# Patient Record
Sex: MALE | Race: NATIVE HAWAIIAN OR OTHER PACIFIC ISLANDER | NOT HISPANIC OR LATINO | Employment: FULL TIME | URBAN - METROPOLITAN AREA
[De-identification: names, ages, dates, MRNs, and addresses within clinical notes are randomized per-mention and may not be internally consistent; named-entity substitution may affect disease eponyms.]

---

## 2019-07-09 ENCOUNTER — APPOINTMENT (OUTPATIENT)
Dept: RADIOLOGY | Age: 34
End: 2019-07-09
Payer: COMMERCIAL

## 2019-07-09 ENCOUNTER — OFFICE VISIT (OUTPATIENT)
Dept: INTERNAL MEDICINE CLINIC | Facility: CLINIC | Age: 34
End: 2019-07-09
Payer: COMMERCIAL

## 2019-07-09 VITALS
BODY MASS INDEX: 39.01 KG/M2 | SYSTOLIC BLOOD PRESSURE: 140 MMHG | DIASTOLIC BLOOD PRESSURE: 98 MMHG | TEMPERATURE: 97.6 F | HEIGHT: 69 IN | OXYGEN SATURATION: 98 % | HEART RATE: 78 BPM | WEIGHT: 263.4 LBS

## 2019-07-09 DIAGNOSIS — M25.562 ACUTE PAIN OF LEFT KNEE: ICD-10-CM

## 2019-07-09 DIAGNOSIS — Z83.3 FAMILY HISTORY OF DIABETES MELLITUS (DM): ICD-10-CM

## 2019-07-09 DIAGNOSIS — M25.562 ACUTE PAIN OF LEFT KNEE: Primary | ICD-10-CM

## 2019-07-09 DIAGNOSIS — E03.9 ACQUIRED HYPOTHYROIDISM: ICD-10-CM

## 2019-07-09 PROCEDURE — 99202 OFFICE O/P NEW SF 15 MIN: CPT | Performed by: INTERNAL MEDICINE

## 2019-07-09 PROCEDURE — 73562 X-RAY EXAM OF KNEE 3: CPT

## 2019-07-09 NOTE — ASSESSMENT & PLAN NOTE
Will order an XR of the left knee  He has returned to the gym and has been performing self therapy exercises to strengthen his knee  Continue with Tylenol/NSAIDs as needed for pain/inflammation  Will contact him regarding x-ray results

## 2019-07-09 NOTE — PROGRESS NOTES
Assessment/Plan:    Acute pain of left knee  Will order an XR of the left knee  He has returned to the gym and has been performing self therapy exercises to strengthen his knee  Continue with Tylenol/NSAIDs as needed for pain/inflammation  Will contact him regarding x-ray results  BMI 38 0-38 9,adult  Discussed diet exercise  Diagnoses and all orders for this visit:    Acute pain of left knee  -     XR knee 3 vw left non injury; Future    BMI 38 0-38 9,adult  -     CBC; Future  -     Comprehensive metabolic panel; Future  -     Lipid panel; Future  -     TSH, 3rd generation with Free T4 reflex; Future  -     Hemoglobin A1C; Future    Acquired hypothyroidism  -     CBC; Future  -     Comprehensive metabolic panel; Future  -     Lipid panel; Future  -     TSH, 3rd generation with Free T4 reflex; Future  -     Hemoglobin A1C; Future    Family history of diabetes mellitus (DM)  -     CBC; Future  -     Comprehensive metabolic panel; Future  -     Lipid panel; Future  -     TSH, 3rd generation with Free T4 reflex; Future  -     Hemoglobin A1C; Future          BMI Counseling: Body mass index is 38 9 kg/m²  Discussed the patient's BMI with him  The BMI is above average  BMI counseling and education was provided to the patient  Nutrition recommendations include reducing portion sizes, decreasing overall calorie intake, 3-5 servings of fruits/vegetables daily, reducing fast food intake, consuming healthier snacks, decreasing soda and/or juice intake, moderation in carbohydrate intake, increasing intake of lean protein, reducing intake of saturated fat and trans fat and reducing intake of cholesterol  Exercise recommendations include moderate aerobic physical activity for 150 minutes/week and exercising 3-5 times per week  Subjective:      Patient ID: Oksana Moreno is a 30 y o  male  42-year-old male is seen today to establish care    He has been having left knee pain for the past 2 weeks that he believes may have occurred during work, possibly a twisting injury  He denies any trauma, fall, or exertional activity  He reports that the pain has gradually worsened to where he now has difficulty with range of motion and weight-bearing  He has been taking Osteo Biflex and Ecedrin with minimal relief of symptoms  He denies any previous knee injuries  Knee Pain    The incident occurred more than 1 week ago  The incident occurred at work  The injury mechanism was a twisting injury  The pain is present in the left knee  The quality of the pain is described as stabbing  The pain is at a severity of 9/10  The pain is severe  Pertinent negatives include no inability to bear weight, loss of motion, loss of sensation, muscle weakness, numbness or tingling  He reports no foreign bodies present  The symptoms are aggravated by weight bearing and movement  The following portions of the patient's history were reviewed and updated as appropriate: allergies, current medications, past family history, past medical history, past social history, past surgical history and problem list     Review of Systems   Constitutional: Negative for activity change, appetite change, chills, diaphoresis, fatigue and fever  HENT: Negative for congestion, postnasal drip, rhinorrhea, sinus pressure, sinus pain, sneezing and sore throat  Eyes: Negative for visual disturbance  Respiratory: Negative for apnea, cough, choking, chest tightness, shortness of breath and wheezing  Cardiovascular: Negative for chest pain, palpitations and leg swelling  Gastrointestinal: Negative for abdominal distention, abdominal pain, anal bleeding, blood in stool, constipation, diarrhea, nausea and vomiting  Endocrine: Negative for cold intolerance and heat intolerance  Genitourinary: Negative for difficulty urinating, dysuria and hematuria  Musculoskeletal:        Knee pain, left     Skin: Negative      Neurological: Negative for dizziness, tingling, weakness, light-headedness, numbness and headaches  Hematological: Negative for adenopathy  Psychiatric/Behavioral: Negative for agitation, sleep disturbance and suicidal ideas  All other systems reviewed and are negative  History reviewed  No pertinent past medical history  No current outpatient medications on file  No Known Allergies    Social History   History reviewed  No pertinent surgical history  Family History   Problem Relation Age of Onset    Hypothyroidism Mother     Heart attack Father     Hypothyroidism Maternal Grandmother     Arthritis Maternal Grandmother     Arthritis Maternal Grandfather     Diabetes Paternal Grandmother     Diabetes Paternal Grandfather        Objective:  /98 (BP Location: Left arm, Patient Position: Sitting, Cuff Size: Large)   Pulse 78   Temp 97 6 °F (36 4 °C) (Oral)   Ht 5' 9" (1 753 m)   Wt 119 kg (263 lb 6 4 oz)   SpO2 98% Comment: room air  BMI 38 90 kg/m²     No results found for this or any previous visit (from the past 1344 hour(s))  Physical Exam   Constitutional: He is oriented to person, place, and time  He appears well-developed and well-nourished  No distress  HENT:   Head: Normocephalic and atraumatic  Eyes: Pupils are equal, round, and reactive to light  Conjunctivae and EOM are normal  Right eye exhibits no discharge  Left eye exhibits no discharge  No scleral icterus  Neck: Normal range of motion  Neck supple  No JVD present  No thyromegaly present  Cardiovascular: Normal rate, regular rhythm, normal heart sounds and intact distal pulses  Exam reveals no gallop and no friction rub  No murmur heard  Pulmonary/Chest: Effort normal and breath sounds normal  No respiratory distress  He has no wheezes  He has no rales  He exhibits no tenderness  Abdominal: Soft  Bowel sounds are normal  He exhibits no distension and no mass  There is no tenderness  There is no rebound and no guarding  Musculoskeletal: Normal range of motion  He exhibits no edema, tenderness or deformity  Left knee: He exhibits swelling (Mild)  He exhibits normal range of motion, no ecchymosis, no deformity, no laceration, no erythema, normal alignment, no bony tenderness, normal meniscus and no MCL laxity  No tenderness found  Lymphadenopathy:     He has no cervical adenopathy  Neurological: He is alert and oriented to person, place, and time  He has normal reflexes  No cranial nerve deficit  Coordination normal    Skin: Skin is warm and dry  No rash noted  He is not diaphoretic  No erythema  No pallor  Psychiatric: He has a normal mood and affect  His behavior is normal  Judgment and thought content normal    Nursing note and vitals reviewed

## 2019-07-10 ENCOUNTER — CLINICAL SUPPORT (OUTPATIENT)
Dept: INTERNAL MEDICINE CLINIC | Facility: CLINIC | Age: 34
End: 2019-07-10
Payer: COMMERCIAL

## 2019-07-10 DIAGNOSIS — E03.9 ACQUIRED HYPOTHYROIDISM: ICD-10-CM

## 2019-07-10 DIAGNOSIS — Z83.3 FAMILY HISTORY OF DIABETES MELLITUS (DM): ICD-10-CM

## 2019-07-10 PROCEDURE — 36415 COLL VENOUS BLD VENIPUNCTURE: CPT

## 2019-07-11 LAB
ALBUMIN SERPL-MCNC: 4.4 G/DL (ref 3.6–5.1)
ALBUMIN/GLOB SERPL: 1.6 (CALC) (ref 1–2.5)
ALP SERPL-CCNC: 59 U/L (ref 40–115)
ALT SERPL-CCNC: 38 U/L (ref 9–46)
AST SERPL-CCNC: 36 U/L (ref 10–40)
BILIRUB SERPL-MCNC: 1 MG/DL (ref 0.2–1.2)
BUN SERPL-MCNC: 16 MG/DL (ref 7–25)
BUN/CREAT SERPL: NORMAL (CALC) (ref 6–22)
CALCIUM SERPL-MCNC: 9.4 MG/DL (ref 8.6–10.3)
CHLORIDE SERPL-SCNC: 104 MMOL/L (ref 98–110)
CHOLEST SERPL-MCNC: 194 MG/DL
CHOLEST/HDLC SERPL: 5.7 (CALC)
CO2 SERPL-SCNC: 24 MMOL/L (ref 20–32)
CREAT SERPL-MCNC: 0.8 MG/DL (ref 0.6–1.35)
ERYTHROCYTE [DISTWIDTH] IN BLOOD BY AUTOMATED COUNT: 13 % (ref 11–15)
GLOBULIN SER CALC-MCNC: 2.8 G/DL (CALC) (ref 1.9–3.7)
GLUCOSE SERPL-MCNC: 84 MG/DL (ref 65–99)
HBA1C MFR BLD: 5.1 % OF TOTAL HGB
HCT VFR BLD AUTO: 43 % (ref 38.5–50)
HDLC SERPL-MCNC: 34 MG/DL
HGB BLD-MCNC: 14.4 G/DL (ref 13.2–17.1)
LDLC SERPL CALC-MCNC: 128 MG/DL (CALC)
MCH RBC QN AUTO: 27.9 PG (ref 27–33)
MCHC RBC AUTO-ENTMCNC: 33.5 G/DL (ref 32–36)
MCV RBC AUTO: 83.3 FL (ref 80–100)
NONHDLC SERPL-MCNC: 160 MG/DL (CALC)
PLATELET # BLD AUTO: 340 THOUSAND/UL (ref 140–400)
PMV BLD REES-ECKER: 9.9 FL (ref 7.5–12.5)
POTASSIUM SERPL-SCNC: 4.4 MMOL/L (ref 3.5–5.3)
PROT SERPL-MCNC: 7.2 G/DL (ref 6.1–8.1)
RBC # BLD AUTO: 5.16 MILLION/UL (ref 4.2–5.8)
SL AMB EGFR AFRICAN AMERICAN: 137 ML/MIN/1.73M2
SL AMB EGFR NON AFRICAN AMERICAN: 118 ML/MIN/1.73M2
SODIUM SERPL-SCNC: 140 MMOL/L (ref 135–146)
TRIGL SERPL-MCNC: 184 MG/DL
TSH SERPL-ACNC: 4.17 MIU/L (ref 0.4–4.5)
WBC # BLD AUTO: 6.4 THOUSAND/UL (ref 3.8–10.8)

## 2019-08-12 ENCOUNTER — OFFICE VISIT (OUTPATIENT)
Dept: INTERNAL MEDICINE CLINIC | Facility: CLINIC | Age: 34
End: 2019-08-12
Payer: COMMERCIAL

## 2019-08-12 VITALS
OXYGEN SATURATION: 99 % | SYSTOLIC BLOOD PRESSURE: 130 MMHG | DIASTOLIC BLOOD PRESSURE: 100 MMHG | BODY MASS INDEX: 38 KG/M2 | HEART RATE: 68 BPM | HEIGHT: 69 IN | TEMPERATURE: 98.1 F | WEIGHT: 256.6 LBS

## 2019-08-12 DIAGNOSIS — I10 ESSENTIAL HYPERTENSION: ICD-10-CM

## 2019-08-12 DIAGNOSIS — Z83.3 FAMILY HISTORY OF DIABETES MELLITUS (DM): ICD-10-CM

## 2019-08-12 PROBLEM — E03.9 ACQUIRED HYPOTHYROIDISM: Status: RESOLVED | Noted: 2019-07-09 | Resolved: 2019-08-12

## 2019-08-12 PROBLEM — M25.562 ACUTE PAIN OF LEFT KNEE: Status: RESOLVED | Noted: 2019-07-09 | Resolved: 2019-08-12

## 2019-08-12 PROCEDURE — 3008F BODY MASS INDEX DOCD: CPT | Performed by: INTERNAL MEDICINE

## 2019-08-12 PROCEDURE — 1036F TOBACCO NON-USER: CPT | Performed by: INTERNAL MEDICINE

## 2019-08-12 PROCEDURE — 99214 OFFICE O/P EST MOD 30 MIN: CPT | Performed by: INTERNAL MEDICINE

## 2019-08-12 NOTE — PROGRESS NOTES
Assessment/Plan:    Essential hypertension  He would like to defer on starting antihypertensives at this time  Discussed potential risk factors of uncontrolled BP  He would like to continues with lifestyle modifications via diet, exercise, and weight loss  Follow up in 3 months  Family history of diabetes mellitus (DM)  A1c 5 1%  Continue with lifestyle modifications  Diagnoses and all orders for this visit:    BMI 38 0-38 9,adult    Essential hypertension    Family history of diabetes mellitus (DM)          BMI Counseling: Body mass index is 37 89 kg/m²  Discussed the patient's BMI with him  The BMI is above average  BMI counseling and education was provided to the patient  Nutrition recommendations include reducing portion sizes, decreasing overall calorie intake, 3-5 servings of fruits/vegetables daily, reducing fast food intake, consuming healthier snacks, decreasing soda and/or juice intake, moderation in carbohydrate intake, increasing intake of lean protein, reducing intake of saturated fat and trans fat and reducing intake of cholesterol  Exercise recommendations include moderate aerobic physical activity for 150 minutes/week and exercising 3-5 times per week  Subjective:      Patient ID: Ashleigh Loya is a 30 y o  male  37-year-old male is seen today for follow-up of chronic conditions  Laboratory studies reviewed today, he had mild hypertriglyceridemia and LDL of 128  He exercises regularly and follows a low carbohydrate diet  Otherwise, CBC, CMP, TSH, and A1c are all within acceptable range  Since last visit, he has been performing home exercises for his left knee pain, to which he reports has improved and he recently was able to exercise on an elliptical for 30 minutes without pain  BP is elevated today, as well as at last visit 1 month ago  He denies any known h/o HTN  Hypertension   This is a chronic problem  The current episode started more than 1 year ago   The problem is unchanged  The problem is uncontrolled  Pertinent negatives include no anxiety, blurred vision, chest pain, headaches, malaise/fatigue, neck pain, orthopnea, palpitations, peripheral edema, PND, shortness of breath or sweats  Risk factors for coronary artery disease include dyslipidemia  Past treatments include nothing  The following portions of the patient's history were reviewed and updated as appropriate: allergies, current medications, past family history, past medical history, past social history, past surgical history and problem list     Review of Systems   Constitutional: Negative for activity change, appetite change, chills, diaphoresis, fatigue, fever and malaise/fatigue  HENT: Negative for congestion, postnasal drip, rhinorrhea, sinus pressure, sinus pain, sneezing and sore throat  Eyes: Negative for blurred vision and visual disturbance  Respiratory: Negative for apnea, cough, choking, chest tightness, shortness of breath and wheezing  Cardiovascular: Negative for chest pain, palpitations, orthopnea, leg swelling and PND  Gastrointestinal: Negative for abdominal distention, abdominal pain, anal bleeding, blood in stool, constipation, diarrhea, nausea and vomiting  Endocrine: Negative for cold intolerance and heat intolerance  Genitourinary: Negative for difficulty urinating, dysuria and hematuria  Musculoskeletal: Negative  Negative for neck pain  Skin: Negative  Neurological: Negative for dizziness, weakness, light-headedness, numbness and headaches  Hematological: Negative for adenopathy  Psychiatric/Behavioral: Negative for agitation, sleep disturbance and suicidal ideas  All other systems reviewed and are negative  History reviewed  No pertinent past medical history  No current outpatient medications on file  No Known Allergies    Social History   History reviewed  No pertinent surgical history    Family History   Problem Relation Age of Onset    Hypothyroidism Mother     Heart attack Father     Hypothyroidism Maternal Grandmother     Arthritis Maternal Grandmother     Arthritis Maternal Grandfather     Diabetes Paternal Grandmother     Diabetes Paternal Grandfather        Objective:  /100 (BP Location: Left arm, Patient Position: Sitting, Cuff Size: Large)   Pulse 68   Temp 98 1 °F (36 7 °C) (Oral)   Ht 5' 9" (1 753 m)   Wt 116 kg (256 lb 9 6 oz)   SpO2 99% Comment: room air  BMI 37 89 kg/m²     Recent Results (from the past 1344 hour(s))   CBC    Collection Time: 07/10/19 10:09 AM   Result Value Ref Range    White Blood Cell Count 6 4 3 8 - 10 8 Thousand/uL    Red Blood Cell Count 5 16 4 20 - 5 80 Million/uL    Hemoglobin 14 4 13 2 - 17 1 g/dL    HCT 43 0 38 5 - 50 0 %    MCV 83 3 80 0 - 100 0 fL    MCH 27 9 27 0 - 33 0 pg    MCHC 33 5 32 0 - 36 0 g/dL    RDW 13 0 11 0 - 15 0 %    Platelet Count 271 300 - 400 Thousand/uL    SL AMB MPV 9 9 7 5 - 12 5 fL   Comprehensive metabolic panel    Collection Time: 07/10/19 10:09 AM   Result Value Ref Range    Glucose, Random 84 65 - 99 mg/dL    BUN 16 7 - 25 mg/dL    Creatinine 0 80 0 60 - 1 35 mg/dL    eGFR Non  118 > OR = 60 mL/min/1 73m2    eGFR  137 > OR = 60 mL/min/1 73m2    SL AMB BUN/CREATININE RATIO NOT APPLICABLE 6 - 22 (calc)    Sodium 140 135 - 146 mmol/L    Potassium 4 4 3 5 - 5 3 mmol/L    Chloride 104 98 - 110 mmol/L    CO2 24 20 - 32 mmol/L    SL AMB CALCIUM 9 4 8 6 - 10 3 mg/dL    Protein, Total 7 2 6 1 - 8 1 g/dL    Albumin 4 4 3 6 - 5 1 g/dL    Globulin 2 8 1 9 - 3 7 g/dL (calc)    Albumin/Globulin Ratio 1 6 1 0 - 2 5 (calc)    TOTAL BILIRUBIN 1 0 0 2 - 1 2 mg/dL    Alkaline Phosphatase 59 40 - 115 U/L    AST 36 10 - 40 U/L    ALT 38 9 - 46 U/L   Lipid panel    Collection Time: 07/10/19 10:09 AM   Result Value Ref Range    Total Cholesterol 194 <200 mg/dL    HDL 34 (L) >40 mg/dL    Triglycerides 184 (H) <150 mg/dL    LDL Direct 128 (H) mg/dL (calc) Chol HDLC Ratio 5 7 (H) <5 0 (calc)    Non-HDL Cholesterol 160 (H) <130 mg/dL (calc)   TSH, 3rd generation with Free T4 reflex    Collection Time: 07/10/19 10:09 AM   Result Value Ref Range    TSH W/RFX TO FREE T4 4 17 0 40 - 4 50 mIU/L   Hemoglobin A1c (w/out EAG)    Collection Time: 07/10/19 10:09 AM   Result Value Ref Range    Hemoglobin A1C 5 1 <5 7 % of total Hgb            Physical Exam   Constitutional: He is oriented to person, place, and time  He appears well-developed and well-nourished  No distress  HENT:   Head: Normocephalic and atraumatic  Eyes: Pupils are equal, round, and reactive to light  Conjunctivae and EOM are normal  Right eye exhibits no discharge  Left eye exhibits no discharge  No scleral icterus  Neck: Normal range of motion  Neck supple  No JVD present  No thyromegaly present  Cardiovascular: Normal rate, regular rhythm, normal heart sounds and intact distal pulses  Exam reveals no gallop and no friction rub  No murmur heard  Pulmonary/Chest: Effort normal and breath sounds normal  No respiratory distress  He has no wheezes  He has no rales  He exhibits no tenderness  Abdominal: Soft  Bowel sounds are normal  He exhibits no distension and no mass  There is no tenderness  There is no rebound and no guarding  Musculoskeletal: Normal range of motion  He exhibits no edema, tenderness or deformity  Lymphadenopathy:     He has no cervical adenopathy  Neurological: He is alert and oriented to person, place, and time  He has normal reflexes  No cranial nerve deficit  Coordination normal    Skin: Skin is warm and dry  No rash noted  He is not diaphoretic  No erythema  No pallor  Psychiatric: He has a normal mood and affect  His behavior is normal  Judgment and thought content normal    Nursing note and vitals reviewed

## 2019-08-12 NOTE — ASSESSMENT & PLAN NOTE
He would like to defer on starting antihypertensives at this time  Discussed potential risk factors of uncontrolled BP  He would like to continues with lifestyle modifications via diet, exercise, and weight loss  Follow up in 3 months

## 2019-11-11 ENCOUNTER — OFFICE VISIT (OUTPATIENT)
Dept: INTERNAL MEDICINE CLINIC | Facility: CLINIC | Age: 34
End: 2019-11-11
Payer: COMMERCIAL

## 2019-11-11 VITALS
OXYGEN SATURATION: 99 % | BODY MASS INDEX: 37.68 KG/M2 | TEMPERATURE: 98 F | SYSTOLIC BLOOD PRESSURE: 126 MMHG | WEIGHT: 254.4 LBS | HEIGHT: 69 IN | HEART RATE: 68 BPM | DIASTOLIC BLOOD PRESSURE: 100 MMHG

## 2019-11-11 DIAGNOSIS — I10 ESSENTIAL HYPERTENSION: ICD-10-CM

## 2019-11-11 DIAGNOSIS — M17.12 PRIMARY OSTEOARTHRITIS OF LEFT KNEE: Primary | ICD-10-CM

## 2019-11-11 PROCEDURE — 99214 OFFICE O/P EST MOD 30 MIN: CPT | Performed by: INTERNAL MEDICINE

## 2019-11-11 PROCEDURE — 1036F TOBACCO NON-USER: CPT | Performed by: INTERNAL MEDICINE

## 2019-11-11 RX ORDER — LISINOPRIL 5 MG/1
5 TABLET ORAL DAILY
Qty: 30 TABLET | Refills: 1 | Status: SHIPPED | OUTPATIENT
Start: 2019-11-11

## 2020-08-18 ENCOUNTER — TELEPHONE (OUTPATIENT)
Dept: INTERNAL MEDICINE CLINIC | Facility: CLINIC | Age: 35
End: 2020-08-18

## 2020-08-18 NOTE — LETTER
August 18, 2020    [address] [place] [phone]      Dear Mr Montiel Margie:    Please call to schedule an appointment today           Sincerely,  Will Navarro MD

## 2020-12-03 ENCOUNTER — VBI (OUTPATIENT)
Dept: ADMINISTRATIVE | Facility: OTHER | Age: 35
End: 2020-12-03

## 2021-06-01 ENCOUNTER — TELEPHONE (OUTPATIENT)
Dept: INTERNAL MEDICINE CLINIC | Facility: CLINIC | Age: 36
End: 2021-06-01

## 2021-06-01 NOTE — TELEPHONE ENCOUNTER
----- Message from Jay Saleem MD sent at 6/1/2021  2:40 PM EDT -----    Please remove me as PCP due to noncompliance  Thank you   ----- Message -----  From: Margarito Trammell  Sent: 5/10/2021   8:37 PM EDT  To: Jay Saleem MD    Please advise what you want done  3 attempts (2 calls and 1 letter)    Last seen 11/11/19